# Patient Record
Sex: FEMALE | URBAN - METROPOLITAN AREA
[De-identification: names, ages, dates, MRNs, and addresses within clinical notes are randomized per-mention and may not be internally consistent; named-entity substitution may affect disease eponyms.]

---

## 2024-06-13 ENCOUNTER — APPOINTMENT (OUTPATIENT)
Dept: URBAN - METROPOLITAN AREA CLINIC 241 | Age: 39
Setting detail: DERMATOLOGY
End: 2024-06-13

## 2024-06-13 DIAGNOSIS — L40.0 PSORIASIS VULGARIS: ICD-10-CM

## 2024-06-13 PROCEDURE — OTHER PRESCRIPTION MEDICATION MANAGEMENT: OTHER

## 2024-06-13 PROCEDURE — OTHER COUNSELING: OTHER

## 2024-06-13 PROCEDURE — OTHER MIPS QUALITY: OTHER

## 2024-06-13 PROCEDURE — OTHER PRESCRIPTION: OTHER

## 2024-06-13 PROCEDURE — 99204 OFFICE O/P NEW MOD 45 MIN: CPT

## 2024-06-13 PROCEDURE — OTHER ADDITIONAL NOTES: OTHER

## 2024-06-13 RX ORDER — CLOBETASOL PROPIONATE 0.5 MG/G
CREAM TOPICAL BID
Qty: 60 | Refills: 5 | Status: ERX | COMMUNITY
Start: 2024-06-13

## 2024-06-13 ASSESSMENT — LOCATION DETAILED DESCRIPTION DERM
LOCATION DETAILED: RIGHT DISTAL PRETIBIAL REGION
LOCATION DETAILED: RIGHT INDEX FINGERNAIL
LOCATION DETAILED: LEFT ELBOW
LOCATION DETAILED: LEFT INDEX FINGERNAIL
LOCATION DETAILED: RIGHT ELBOW

## 2024-06-13 ASSESSMENT — LOCATION ZONE DERM
LOCATION ZONE: FINGERNAIL
LOCATION ZONE: ARM
LOCATION ZONE: LEG

## 2024-06-13 ASSESSMENT — LOCATION SIMPLE DESCRIPTION DERM
LOCATION SIMPLE: LEFT INDEX FINGERNAIL
LOCATION SIMPLE: RIGHT INDEX FINGERNAIL
LOCATION SIMPLE: LEFT ELBOW
LOCATION SIMPLE: RIGHT PRETIBIAL REGION
LOCATION SIMPLE: RIGHT ELBOW

## 2024-06-13 ASSESSMENT — BSA PSORIASIS: % BODY COVERED IN PSORIASIS: 10

## 2024-06-13 ASSESSMENT — ITCH NUMERIC RATING SCALE: HOW SEVERE IS YOUR ITCHING?: 1

## 2024-06-13 ASSESSMENT — PGA PSORIASIS: PGA PSORIASIS 2020: MILD

## 2024-06-13 NOTE — PROCEDURE: PRESCRIPTION MEDICATION MANAGEMENT
Render In Strict Bullet Format?: No
Detail Level: Zone
Initiate Treatment: Clobetasol 0.05% topical cream BID PRN with flares

## 2024-06-13 NOTE — PROCEDURE: ADDITIONAL NOTES
Render Risk Assessment In Note?: no
Additional Notes: - Briefly discussed systemic biologics with patient today.\\n- Patient has current patches on the elbows and left lower leg. Patient is also experiencing nail changes and more so joint aches.\\n- Recommended patient to follow up with a rheumatologist, Dr. Sapp, as she is experiencing joint pain that is bothersome and seems to be worsening.  \\n-I recommend waiting before starting a bioloigc until she has had her Rheumatology work up.  Dr. Sapp may want to use a specific biologic for her joint pain.  \\n-Patient is experiencing some other symptoms of fatigue and leg \"tingling\" feelings. \\n-patient states work up by PCP was unremarkable.  \\n- Patient was given brochures of Skyrizi and Tremfya.
Detail Level: Simple

## 2024-06-18 ENCOUNTER — APPOINTMENT (OUTPATIENT)
Dept: URBAN - METROPOLITAN AREA CLINIC 241 | Age: 39
Setting detail: DERMATOLOGY
End: 2024-06-20

## 2024-06-18 DIAGNOSIS — L40.0 PSORIASIS VULGARIS: ICD-10-CM

## 2024-06-18 PROCEDURE — OTHER ORDER TESTS: OTHER

## 2024-06-18 NOTE — PROCEDURE: ORDER TESTS
Billing Type: Third-Party Bill
Performing Laboratory: 0
Expected Date Of Service: 06/18/2024
Bill For Surgical Tray: no

## 2024-06-21 ENCOUNTER — RX ONLY (RX ONLY)
Age: 39
End: 2024-06-21

## 2024-06-21 RX ORDER — RISANKIZUMAB-RZAA 150 MG/ML
INJECTION SUBCUTANEOUS
Qty: 1 | Refills: 1 | Status: ERX | COMMUNITY
Start: 2024-06-21

## 2024-06-21 RX ORDER — RISANKIZUMAB-RZAA 150 MG/ML
INJECTION SUBCUTANEOUS
Qty: 2 | Refills: 0 | Status: ERX

## 2024-06-24 ENCOUNTER — RX ONLY (RX ONLY)
Age: 39
End: 2024-06-24

## 2024-06-24 RX ORDER — RISANKIZUMAB-RZAA 150 MG/ML
INJECTION SUBCUTANEOUS
Qty: 1 | Refills: 1 | Status: ERX

## 2024-07-23 ENCOUNTER — RX ONLY (RX ONLY)
Age: 39
End: 2024-07-23

## 2024-07-23 RX ORDER — RISANKIZUMAB-RZAA 150 MG/ML
INJECTION SUBCUTANEOUS
Qty: 2 | Refills: 0 | Status: ERX

## 2024-09-16 ENCOUNTER — APPOINTMENT (OUTPATIENT)
Dept: URBAN - METROPOLITAN AREA CLINIC 241 | Age: 39
Setting detail: DERMATOLOGY
End: 2024-09-16

## 2024-09-16 DIAGNOSIS — L40.0 PSORIASIS VULGARIS: ICD-10-CM

## 2024-09-16 PROCEDURE — OTHER MIPS QUALITY: OTHER

## 2024-09-16 PROCEDURE — OTHER COUNSELING: OTHER

## 2024-09-16 PROCEDURE — OTHER PRESCRIPTION MEDICATION MANAGEMENT: OTHER

## 2024-09-16 PROCEDURE — OTHER SKYRIZI MONITORING: OTHER

## 2024-09-16 PROCEDURE — 99213 OFFICE O/P EST LOW 20 MIN: CPT

## 2024-09-16 PROCEDURE — OTHER ADDITIONAL NOTES: OTHER

## 2024-09-16 ASSESSMENT — PGA PSORIASIS: PGA PSORIASIS 2020: ALMOST CLEAR

## 2024-09-16 ASSESSMENT — BSA PSORIASIS: % BODY COVERED IN PSORIASIS: 2

## 2024-09-16 ASSESSMENT — LOCATION SIMPLE DESCRIPTION DERM
LOCATION SIMPLE: RIGHT INDEX FINGERNAIL
LOCATION SIMPLE: RIGHT ELBOW
LOCATION SIMPLE: LEFT INDEX FINGERNAIL
LOCATION SIMPLE: RIGHT PRETIBIAL REGION
LOCATION SIMPLE: LEFT ELBOW

## 2024-09-16 ASSESSMENT — LOCATION ZONE DERM
LOCATION ZONE: LEG
LOCATION ZONE: FINGERNAIL
LOCATION ZONE: ARM

## 2024-09-16 ASSESSMENT — LOCATION DETAILED DESCRIPTION DERM
LOCATION DETAILED: RIGHT INDEX FINGERNAIL
LOCATION DETAILED: LEFT INDEX FINGERNAIL
LOCATION DETAILED: LEFT ELBOW
LOCATION DETAILED: RIGHT DISTAL PRETIBIAL REGION
LOCATION DETAILED: RIGHT ELBOW

## 2024-09-16 ASSESSMENT — ITCH NUMERIC RATING SCALE: HOW SEVERE IS YOUR ITCHING?: 0

## 2024-09-16 NOTE — PROCEDURE: PRESCRIPTION MEDICATION MANAGEMENT
Continue Regimen: Continue Skyrizi\\nContinue Clobetasol cream\\nNO refills needed today
Render In Strict Bullet Format?: No
Detail Level: Zone

## 2024-09-16 NOTE — PROCEDURE: ADDITIONAL NOTES
Render Risk Assessment In Note?: no
Additional Notes: -Pt reports that nail ridging is still present\\n-Pt reports she went to Rheumatology who then refered to Neurology due to numbness and tingling and is still getting worked up for that.
Detail Level: Simple

## 2024-09-16 NOTE — PROCEDURE: SKYRIZI MONITORING
Add High Risk Medication Management Associated Diagnosis?: No
Length Of Therapy: 4 months
Latest Quantiferon Gold (Optional): 6/24 neg
Detail Level: Zone
Comments: -loading dose and 1 maintenance injection given

## 2025-03-10 ENCOUNTER — RX ONLY (RX ONLY)
Age: 40
End: 2025-03-10

## 2025-03-10 ENCOUNTER — APPOINTMENT (OUTPATIENT)
Dept: URBAN - METROPOLITAN AREA CLINIC 241 | Age: 40
Setting detail: DERMATOLOGY
End: 2025-03-10

## 2025-03-10 DIAGNOSIS — L40.0 PSORIASIS VULGARIS: ICD-10-CM

## 2025-03-10 PROCEDURE — OTHER PRESCRIPTION MEDICATION MANAGEMENT: OTHER

## 2025-03-10 PROCEDURE — OTHER COUNSELING: OTHER

## 2025-03-10 PROCEDURE — 99214 OFFICE O/P EST MOD 30 MIN: CPT

## 2025-03-10 PROCEDURE — OTHER MIPS QUALITY: OTHER

## 2025-03-10 PROCEDURE — OTHER PRESCRIPTION: OTHER

## 2025-03-10 PROCEDURE — OTHER SKYRIZI MONITORING: OTHER

## 2025-03-10 PROCEDURE — OTHER ADDITIONAL NOTES: OTHER

## 2025-03-10 PROCEDURE — OTHER ORDER TESTS: OTHER

## 2025-03-10 RX ORDER — RISANKIZUMAB-RZAA 150 MG/ML
INJECTION SUBCUTANEOUS
Qty: 1 | Refills: 2 | Status: ERX

## 2025-03-10 RX ORDER — RISANKIZUMAB-RZAA 150 MG/ML
INJECTION SUBCUTANEOUS
Qty: 1 | Refills: 1 | Status: ERX

## 2025-03-10 ASSESSMENT — PGA PSORIASIS: PGA PSORIASIS 2020: CLEAR

## 2025-03-10 ASSESSMENT — LOCATION DETAILED DESCRIPTION DERM
LOCATION DETAILED: RIGHT DISTAL PRETIBIAL REGION
LOCATION DETAILED: LEFT INDEX FINGERNAIL
LOCATION DETAILED: RIGHT INDEX FINGERNAIL
LOCATION DETAILED: RIGHT ELBOW
LOCATION DETAILED: LEFT ELBOW

## 2025-03-10 ASSESSMENT — LOCATION ZONE DERM
LOCATION ZONE: FINGERNAIL
LOCATION ZONE: LEG
LOCATION ZONE: ARM

## 2025-03-10 ASSESSMENT — ITCH NUMERIC RATING SCALE: HOW SEVERE IS YOUR ITCHING?: 0

## 2025-03-10 ASSESSMENT — BSA PSORIASIS: % BODY COVERED IN PSORIASIS: 0

## 2025-03-10 ASSESSMENT — LOCATION SIMPLE DESCRIPTION DERM
LOCATION SIMPLE: LEFT INDEX FINGERNAIL
LOCATION SIMPLE: LEFT ELBOW
LOCATION SIMPLE: RIGHT PRETIBIAL REGION
LOCATION SIMPLE: RIGHT INDEX FINGERNAIL
LOCATION SIMPLE: RIGHT ELBOW

## 2025-03-10 NOTE — PROCEDURE: ADDITIONAL NOTES
Additional Notes: - Patient states she has significantly improved while being on Skyrizi. Patient states her elbows have remained clear and her ridging nails have improved. \\n- Patient states she is going to get a second opinion with a rheumatologist.\\n- Patient states her joint pain has lessened while being on Skyrizi.\\n- Advised patient we will initiate PA as she is due for her yearly.\\n- Patient states insurance has not changed with the new year. Strongly advised patient to notify us I she ever gets new insurance to initiate new PA.\\n-patient is going to f/u with [facility] for a second opinion for her joint pain.
Detail Level: Simple
Render Risk Assessment In Note?: no

## 2025-03-10 NOTE — PROCEDURE: SKYRIZI MONITORING
Add High Risk Medication Management Associated Diagnosis?: No
Length Of Therapy: 10 months
Latest Quantiferon Gold (Optional): 6/24 neg
Detail Level: Zone
Comments: ~ specialty pharmacy: [facility]\\n~ Advised patient PA is die and will be competed today.\\n~ Patient is due for yearly labs 6/2025 and was given a lab order today.

## 2025-03-10 NOTE — PROCEDURE: ORDER TESTS
Performing Laboratory: 0
Billing Type: Third-Party Bill
Expected Date Of Service: 03/10/2025
Bill For Surgical Tray: no